# Patient Record
Sex: FEMALE | Race: WHITE | Employment: FULL TIME | URBAN - METROPOLITAN AREA
[De-identification: names, ages, dates, MRNs, and addresses within clinical notes are randomized per-mention and may not be internally consistent; named-entity substitution may affect disease eponyms.]

---

## 2018-12-03 ENCOUNTER — HOSPITAL ENCOUNTER (OUTPATIENT)
Dept: ULTRASOUND IMAGING | Age: 38
Discharge: HOME | End: 2018-12-03
Payer: COMMERCIAL

## 2018-12-03 DIAGNOSIS — N92.1 EXCESSIVE AND FREQUENT MENSTRUATION WITH IRREGULAR CYCLE: ICD-10-CM

## 2018-12-03 PROCEDURE — 76830 TRANSVAGINAL US NON-OB: CPT

## 2018-12-03 PROCEDURE — 76856 US EXAM PELVIC COMPLETE: CPT

## 2020-09-28 ENCOUNTER — TELEPHONE (OUTPATIENT)
Dept: GYNECOLOGY | Age: 40
End: 2020-09-28

## 2020-09-28 NOTE — TELEPHONE ENCOUNTER
Patient would be NEW  Wants an appointment soon  Patient is having problems with her periods: bloating, gas, heavy bleeding, severe cramps (also having gastro issues which may be related). Patient can be reached at [phone]  Patient was Dr. Dg Burgess patient in 2015 and would like to be again.

## 2020-10-02 ENCOUNTER — OFFICE VISIT (OUTPATIENT)
Dept: GYNECOLOGY | Age: 40
End: 2020-10-02
Payer: COMMERCIAL

## 2020-10-02 VITALS
BODY MASS INDEX: 20.9 KG/M2 | SYSTOLIC BLOOD PRESSURE: 110 MMHG | HEART RATE: 70 BPM | TEMPERATURE: 97 F | DIASTOLIC BLOOD PRESSURE: 64 MMHG | WEIGHT: 146 LBS | RESPIRATION RATE: 17 BRPM | HEIGHT: 70 IN

## 2020-10-02 PROCEDURE — 99385 PREV VISIT NEW AGE 18-39: CPT | Performed by: OBSTETRICS & GYNECOLOGY

## 2020-10-02 RX ORDER — CITALOPRAM 20 MG/1
20 TABLET ORAL DAILY
COMMUNITY

## 2020-10-02 ASSESSMENT — ENCOUNTER SYMPTOMS
EYES NEGATIVE: 1
GASTROINTESTINAL NEGATIVE: 1
RESPIRATORY NEGATIVE: 1

## 2020-10-02 NOTE — PROGRESS NOTES
Subjective:      Patient ID: Ana Hanks is a 44 y.o. female. Patient is here for annual. Patient with dysmenorrhea-has upper abdominal pain with cycle, too. Patient with hx hgsil. Review of Systems   Constitutional: Negative. HENT: Negative. Eyes: Negative. Respiratory: Negative. Cardiovascular: Negative. Gastrointestinal: Negative. Genitourinary: Positive for menstrual problem and pelvic pain. Musculoskeletal: Negative. Skin: Negative. Neurological: Negative. Psychiatric/Behavioral: Negative.       Date of Birth 1980  Past Medical History:   Diagnosis Date    Abnormal Pap smear of cervix     lgsil, hpv+    Cervical high risk HPV (human papillomavirus) test positive     Dysmenorrhea     Dysplasia of cervix     High grade squamous intraepithelial lesion of cervix 2015    HPV (human papilloma virus) infection     lgsil and hpv+    Low grade squamous intraepith lesion on cytologic smear cervix (lgsil)     Menorrhagia      Past Surgical History:   Procedure Laterality Date    COLPOSCOPY  10/5/2015    showed mild to moderate dysplasia     OB History    Para Term  AB Living   0 0 0 0 0 0   SAB TAB Ectopic Molar Multiple Live Births   0 0 0   0       Social History     Socioeconomic History    Marital status: Single     Spouse name: Not on file    Number of children: Not on file    Years of education: Not on file    Highest education level: Not on file   Occupational History    Not on file   Social Needs    Financial resource strain: Not on file    Food insecurity     Worry: Not on file     Inability: Not on file    Transportation needs     Medical: Not on file     Non-medical: Not on file   Tobacco Use    Smoking status: Never Smoker    Smokeless tobacco: Never Used   Substance and Sexual Activity    Alcohol use: Not Currently     Alcohol/week: 0.0 standard drinks    Drug use: No    Sexual activity: Yes     Partners: Male Lifestyle    Physical activity     Days per week: Not on file     Minutes per session: Not on file    Stress: Not on file   Relationships    Social connections     Talks on phone: Not on file     Gets together: Not on file     Attends Gnosticist service: Not on file     Active member of club or organization: Not on file     Attends meetings of clubs or organizations: Not on file     Relationship status: Not on file    Intimate partner violence     Fear of current or ex partner: Not on file     Emotionally abused: Not on file     Physically abused: Not on file     Forced sexual activity: Not on file   Other Topics Concern    Not on file   Social History Narrative    Not on file     No Known Allergies  Outpatient Medications Marked as Taking for the 10/2/20 encounter (Office Visit) with Attila Jackson MD   Medication Sig Dispense Refill    citalopram (CELEXA) 20 MG tablet Take 20 mg by mouth daily       Family History   Problem Relation Age of Onset    Rheum Arthritis Neg Hx     Osteoarthritis Neg Hx     Asthma Neg Hx     Breast Cancer Neg Hx     Cancer Neg Hx     Diabetes Neg Hx     Heart Failure Neg Hx     High Cholesterol Neg Hx     Hypertension Neg Hx     Migraines Neg Hx     Ovarian Cancer Neg Hx     Rashes/Skin Problems Neg Hx     Seizures Neg Hx     Stroke Neg Hx     Thyroid Disease Neg Hx      /64 (Site: Right Upper Arm, Position: Sitting, Cuff Size: Medium Adult)   Pulse 70   Temp 97 °F (36.1 °C) (Oral)   Resp 17   Ht 5' 10\" (1.778 m)   Wt 146 lb (66.2 kg)   LMP 09/26/2020   BMI 20.95 kg/m²       Objective:   Physical Exam  Constitutional:       Appearance: Normal appearance. She is well-developed and normal weight. HENT:      Head: Normocephalic. Nose: Nose normal.      Mouth/Throat:      Mouth: Mucous membranes are moist.      Pharynx: Oropharynx is clear. Eyes:      Pupils: Pupils are equal, round, and reactive to light.    Neck:      Musculoskeletal: Normal Lymphadenopathy:      Cervical: No cervical adenopathy. Lower Body: No right inguinal adenopathy. No left inguinal adenopathy. Skin:     General: Skin is warm and dry. Coloration: Skin is not pale. Findings: No erythema or rash. Neurological:      General: No focal deficit present. Mental Status: She is alert and oriented to person, place, and time. Mental status is at baseline. Deep Tendon Reflexes: Reflexes are normal and symmetric. Psychiatric:         Mood and Affect: Mood normal.         Behavior: Behavior normal.         Thought Content: Thought content normal.         Judgment: Judgment normal.         Assessment:      1. Annual  2. Dysmenorrhea  3. Upper abdominal pain  4. Menorrhagia  5. Family hx VTE-stroke-mom at age 21      Plan:      3. Pap, calcium, exercise, mammogram  2-4. Discussed symptoms. Feel could have endo. Will proceed with CT abd/pv given upper abdominal pain, too.   5. Given fam hx stroke-needs coagulation work up. Referral to Hematology.         Sharifa Dutta MD

## 2020-10-05 LAB
HPV COMMENT: ABNORMAL
HPV TYPE 16: NOT DETECTED
HPV TYPE 18: NOT DETECTED
HPVOH (OTHER TYPES): DETECTED

## 2020-10-19 ENCOUNTER — TELEPHONE (OUTPATIENT)
Dept: ADMINISTRATIVE | Age: 40
End: 2020-10-19

## 2020-11-09 ENCOUNTER — HOSPITAL ENCOUNTER (OUTPATIENT)
Dept: ULTRASOUND IMAGING | Age: 40
Discharge: HOME | End: 2020-11-09
Payer: COMMERCIAL

## 2020-11-09 PROCEDURE — 76700 US EXAM ABDOM COMPLETE: CPT

## 2020-11-09 PROCEDURE — 76856 US EXAM PELVIC COMPLETE: CPT

## 2020-11-09 PROCEDURE — 76830 TRANSVAGINAL US NON-OB: CPT

## 2020-11-13 ENCOUNTER — TELEPHONE (OUTPATIENT)
Dept: GYNECOLOGY | Age: 40
End: 2020-11-13

## 2020-11-13 NOTE — TELEPHONE ENCOUNTER
Called patient at [phone] left a message for a return call. Called patient to let her know that her pap was normal and her HPV + and is to come back in 6 months for a repeat pap  In April 2021. Repeat pap is scheduled for 4/6/2021 at 8:40am at [facility].

## 2020-11-13 NOTE — TELEPHONE ENCOUNTER
Patient returned call, gave her results and also changed her appointment to 4/5/2021for repeat pap  at [facility] patient works on Tuesdays all day.

## 2020-11-19 DIAGNOSIS — N83.209 CYST OF OVARY, UNSPECIFIED LATERALITY: Primary | ICD-10-CM

## 2021-06-07 ENCOUNTER — OFFICE VISIT (OUTPATIENT)
Dept: GYNECOLOGY | Age: 41
End: 2021-06-07
Payer: COMMERCIAL

## 2021-06-07 VITALS
OXYGEN SATURATION: 98 % | BODY MASS INDEX: 22.66 KG/M2 | DIASTOLIC BLOOD PRESSURE: 65 MMHG | HEART RATE: 78 BPM | SYSTOLIC BLOOD PRESSURE: 110 MMHG | HEIGHT: 69 IN | RESPIRATION RATE: 17 BRPM | WEIGHT: 153 LBS

## 2021-06-07 DIAGNOSIS — N83.202 LEFT OVARIAN CYST: ICD-10-CM

## 2021-06-07 DIAGNOSIS — B97.7 HIGH RISK HPV INFECTION: Primary | ICD-10-CM

## 2021-06-07 PROCEDURE — 99213 OFFICE O/P EST LOW 20 MIN: CPT | Performed by: OBSTETRICS & GYNECOLOGY

## 2021-06-07 ASSESSMENT — ENCOUNTER SYMPTOMS
RESPIRATORY NEGATIVE: 1
EYES NEGATIVE: 1
GASTROINTESTINAL NEGATIVE: 1

## 2021-06-08 NOTE — PROGRESS NOTES
[address]  Dept: [phone]  Dept Fax: [phone]: [phone]     2021    Vickie Carrion (:  1980) is a 36 y.o. female, here for evaluation of the following medical concerns:    Patient is here for repeat pap due to hpv. Hx bridger 3 and ckc. Patient did not do follow-up pelvic US. Is on her cycle. Review of Systems   Constitutional: Negative. HENT: Negative. Eyes: Negative. Respiratory: Negative. Cardiovascular: Negative. Gastrointestinal: Negative. Genitourinary: Positive for pelvic pain. Musculoskeletal: Negative. Skin: Negative. Neurological: Negative. Psychiatric/Behavioral: Negative.       Date of Birth 1980  Past Medical History:   Diagnosis Date    Abnormal Pap smear of cervix     lgsil, hpv+    Cervical high risk HPV (human papillomavirus) test positive     Dysmenorrhea     Dysplasia of cervix     High grade squamous intraepithelial lesion of cervix 2015    HPV (human papilloma virus) infection 2015    lgsil and hpv+    Low grade squamous intraepith lesion on cytologic smear cervix (lgsil)     Menorrhagia      Past Surgical History:   Procedure Laterality Date    COLPOSCOPY  10/5/2015    showed mild to moderate dysplasia     OB History    Para Term  AB Living   0 0 0 0 0 0   SAB TAB Ectopic Molar Multiple Live Births   0 0 0   0       Social History     Socioeconomic History    Marital status: Single     Spouse name: Not on file    Number of children: Not on file    Years of education: Not on file    Highest education level: Not on file   Occupational History    Not on file   Tobacco Use    Smoking status: Never Smoker    Smokeless tobacco: Never Used   Vaping Use    Vaping Use: Never used   Substance and Sexual Activity    Alcohol use: Not Currently     Alcohol/week: 0.0 standard drinks    Drug use: No    (CELEXA) 20 MG tablet Take 20 mg by mouth daily Yes Anmol Rodriguez        No Known Allergies    Past Medical History:   Diagnosis Date    Abnormal Pap smear of cervix 2015    lgsil, hpv+    Cervical high risk HPV (human papillomavirus) test positive 2012    Dysmenorrhea     Dysplasia of cervix     High grade squamous intraepithelial lesion of cervix 9/2015    HPV (human papilloma virus) infection 2015    lgsil and hpv+    Low grade squamous intraepith lesion on cytologic smear cervix (lgsil) 2012    Menorrhagia        Past Surgical History:   Procedure Laterality Date    COLPOSCOPY  10/5/2015    showed mild to moderate dysplasia       Social History     Socioeconomic History    Marital status: Single     Spouse name: Not on file    Number of children: Not on file    Years of education: Not on file    Highest education level: Not on file   Occupational History    Not on file   Tobacco Use    Smoking status: Never Smoker    Smokeless tobacco: Never Used   Vaping Use    Vaping Use: Never used   Substance and Sexual Activity    Alcohol use: Not Currently     Alcohol/week: 0.0 standard drinks    Drug use: No    Sexual activity: Yes     Partners: Male   Other Topics Concern    Not on file   Social History Narrative    Not on file     Social Determinants of Health     Financial Resource Strain:     Difficulty of Paying Living Expenses:    Food Insecurity:     Worried About Running Out of Food in the Last Year:     Ran Out of Food in the Last Year:    Transportation Needs:     Lack of Transportation (Medical):      Lack of Transportation (Non-Medical):    Physical Activity:     Days of Exercise per Week:     Minutes of Exercise per Session:    Stress:     Feeling of Stress :    Social Connections:     Frequency of Communication with Friends and Family:     Frequency of Social Gatherings with Friends and Family:     Attends Yazidi Services:     Active Member of Clubs or Organizations:

## 2021-06-09 LAB
HPV COMMENT: NORMAL
HPV TYPE 16: NOT DETECTED
HPV TYPE 18: NOT DETECTED
HPVOH (OTHER TYPES): NOT DETECTED

## 2021-06-24 DIAGNOSIS — N72 CERVICAL INFLAMMATION: ICD-10-CM

## 2021-06-24 DIAGNOSIS — N73.0 PID (ACUTE PELVIC INFLAMMATORY DISEASE): Primary | ICD-10-CM

## 2021-06-24 RX ORDER — CLINDAMYCIN PHOSPHATE 20 MG/G
CREAM VAGINAL
Qty: 1 TUBE | Refills: 0 | OUTPATIENT
Start: 2021-06-24

## 2022-06-13 ENCOUNTER — HOSPITAL ENCOUNTER (OUTPATIENT)
Dept: MAMMOGRAPHY | Age: 42
Discharge: HOME | End: 2022-06-13
Payer: COMMERCIAL

## 2022-06-13 VITALS — HEIGHT: 69 IN | WEIGHT: 160 LBS | BODY MASS INDEX: 23.7 KG/M2

## 2022-06-13 DIAGNOSIS — Z12.31 VISIT FOR SCREENING MAMMOGRAM: ICD-10-CM

## 2022-06-13 PROCEDURE — 77063 BREAST TOMOSYNTHESIS BI: CPT

## 2022-06-20 ENCOUNTER — HOSPITAL ENCOUNTER (OUTPATIENT)
Dept: MAMMOGRAPHY | Age: 42
Discharge: HOME | End: 2022-06-20
Payer: COMMERCIAL

## 2022-06-20 DIAGNOSIS — R92.8 ABNORMAL MAMMOGRAM: ICD-10-CM

## 2022-06-20 PROCEDURE — G0279 TOMOSYNTHESIS, MAMMO: HCPCS

## 2022-11-01 ENCOUNTER — TELEPHONE (OUTPATIENT)
Dept: GYNECOLOGY | Age: 42
End: 2022-11-01

## 2022-11-01 NOTE — LETTER
[facility]  [address] [address]  Phone: [phone]  Fax: [phone]    Rosalind Albarran MD    November 1, 2022    Charly Sosa  [address] [place] [address]    Dear Bere Francois:    I find it necessary to inform you that I must withdraw my professional commitment to you as a physician due to non-compliance. It is essential that you continue to receive medical care for your condition. Therefore, I recommend you make immediate arrangements with another physician to provide needed care. To assist you in locating another physician, I have provided the website and phone number for the [facility], [email or web address] or [phone]. For emergency medical services, please go to the nearest emergency room for treatment. Local emergency room assistance is available at:   [address] [place] [address], [address]    If you wish, I will continue to treat your urgent medical needs for the following thirty (30) days from today's date. Enclosed is a form authorizing me to release a copy of your medical records to your new physician. I will forward your records promptly upon receipt of this form, signed by you, with completed name and address of the physician to receive your records.     Respectfully,        Rosalind Albarran MD